# Patient Record
Sex: FEMALE
[De-identification: names, ages, dates, MRNs, and addresses within clinical notes are randomized per-mention and may not be internally consistent; named-entity substitution may affect disease eponyms.]

---

## 2023-09-07 ENCOUNTER — APPOINTMENT (OUTPATIENT)
Dept: ORTHOPEDIC SURGERY | Facility: CLINIC | Age: 28
End: 2023-09-07
Payer: COMMERCIAL

## 2023-09-07 VITALS — RESPIRATION RATE: 16 BRPM | HEIGHT: 67 IN | BODY MASS INDEX: 21.19 KG/M2 | WEIGHT: 135 LBS

## 2023-09-07 DIAGNOSIS — G56.21 LESION OF ULNAR NERVE, RIGHT UPPER LIMB: ICD-10-CM

## 2023-09-07 DIAGNOSIS — Z78.9 OTHER SPECIFIED HEALTH STATUS: ICD-10-CM

## 2023-09-07 DIAGNOSIS — G56.22 LESION OF ULNAR NERVE, LEFT UPPER LIMB: ICD-10-CM

## 2023-09-07 DIAGNOSIS — M25.531 PAIN IN RIGHT WRIST: ICD-10-CM

## 2023-09-07 DIAGNOSIS — M25.532 PAIN IN LEFT WRIST: ICD-10-CM

## 2023-09-07 PROBLEM — Z00.00 ENCOUNTER FOR PREVENTIVE HEALTH EXAMINATION: Status: ACTIVE | Noted: 2023-09-07

## 2023-09-07 PROCEDURE — 73070 X-RAY EXAM OF ELBOW: CPT | Mod: 50

## 2023-09-07 PROCEDURE — 73110 X-RAY EXAM OF WRIST: CPT | Mod: 50

## 2023-09-07 PROCEDURE — 99204 OFFICE O/P NEW MOD 45 MIN: CPT

## 2023-09-07 NOTE — PHYSICAL EXAM
[de-identified] : Positive tenderness at first dorsal compartment bilaterally with a positive bilateral Finkelstein's test.  Negative Tinel's over right cubital tunnel but positive Tinel's over the left cubital tunnel.  Full strength of first dorsal interosseous, FDP to ring and small finger as well as a strong Marcellus maneuver bilaterally [de-identified] : PA lateral oblique x-rays of both wrist do not demonstrate any acute osseous abnormality.  PA and lateral both elbows normal

## 2023-09-07 NOTE — HISTORY OF PRESENT ILLNESS
[Right] : right hand dominant [FreeTextEntry1] : Patient presents for evaluation bilateral radial wrist pain and bilateral small and half of ring finger numbness for the past 9 months.  No specific trauma.  Patient states she has a 1-year-old baby (not breast feeding).  She states she uses a camera often and is a . She notices most pain while holding the camera.   Patient has seen her primary care doctor who prescribed splints which have not been beneficial in symptom relief. No EMG done.

## 2023-09-07 NOTE — ASSESSMENT
[FreeTextEntry1] : Patient has cubital tunnel bilaterally.  Recommend elbow pad splint and avoidance of medial elbow pressure and prolonged elbow flexion.  Also has bilateral de Quervain's tendinitis.  Recommend that she undergo bilateral first dorsal compartment injections but patient apprehensive to undergo cortisone injection because her father had nerve type symptoms after an injection.  We will administer thumb spica splint.  When symptoms persist she should really consider trying a first dorsal compartment injection